# Patient Record
Sex: FEMALE | Race: ASIAN | Employment: UNEMPLOYED | URBAN - METROPOLITAN AREA
[De-identification: names, ages, dates, MRNs, and addresses within clinical notes are randomized per-mention and may not be internally consistent; named-entity substitution may affect disease eponyms.]

---

## 2017-01-30 DIAGNOSIS — Z48.298 AFTERCARE FOLLOWING ORGAN TRANSPLANT: ICD-10-CM

## 2017-01-30 DIAGNOSIS — Z94.0 KIDNEY REPLACED BY TRANSPLANT: ICD-10-CM

## 2017-01-30 DIAGNOSIS — Z79.899 ENCOUNTER FOR LONG-TERM CURRENT USE OF MEDICATION: ICD-10-CM

## 2017-01-30 PROCEDURE — 80197 ASSAY OF TACROLIMUS: CPT | Performed by: INTERNAL MEDICINE

## 2017-02-01 LAB
TACROLIMUS BLD-MCNC: 5.9 UG/L (ref 5–15)
TME LAST DOSE: NORMAL H

## 2017-02-28 ENCOUNTER — TRANSFERRED RECORDS (OUTPATIENT)
Dept: HEALTH INFORMATION MANAGEMENT | Facility: CLINIC | Age: 47
End: 2017-02-28

## 2017-03-10 ENCOUNTER — TELEPHONE (OUTPATIENT)
Dept: NEPHROLOGY | Facility: CLINIC | Age: 47
End: 2017-03-10

## 2017-03-10 NOTE — TELEPHONE ENCOUNTER
BP readings February to be scanned into chart. 2/28/17) 123/99 P 93 am 106/97 P 102 pm  Mayra Sheppard LPN  Nephrology  [facility]  [phone]

## 2017-03-30 DIAGNOSIS — Z79.899 ENCOUNTER FOR LONG-TERM CURRENT USE OF MEDICATION: ICD-10-CM

## 2017-03-30 DIAGNOSIS — Z94.0 KIDNEY REPLACED BY TRANSPLANT: ICD-10-CM

## 2017-03-30 DIAGNOSIS — Z48.298 AFTERCARE FOLLOWING ORGAN TRANSPLANT: ICD-10-CM

## 2017-03-30 PROCEDURE — 80197 ASSAY OF TACROLIMUS: CPT | Performed by: INTERNAL MEDICINE

## 2017-04-01 LAB
TACROLIMUS BLD-MCNC: 4.9 UG/L (ref 5–15)
TME LAST DOSE: ABNORMAL H

## 2017-04-30 ENCOUNTER — MEDICAL CORRESPONDENCE (OUTPATIENT)
Dept: HEALTH INFORMATION MANAGEMENT | Facility: CLINIC | Age: 47
End: 2017-04-30

## 2017-05-31 DIAGNOSIS — Z94.0 KIDNEY REPLACED BY TRANSPLANT: ICD-10-CM

## 2017-05-31 DIAGNOSIS — Z79.899 ENCOUNTER FOR LONG-TERM CURRENT USE OF MEDICATION: ICD-10-CM

## 2017-05-31 DIAGNOSIS — Z48.298 AFTERCARE FOLLOWING ORGAN TRANSPLANT: ICD-10-CM

## 2017-05-31 PROCEDURE — 80197 ASSAY OF TACROLIMUS: CPT | Performed by: INTERNAL MEDICINE

## 2017-06-02 LAB
TACROLIMUS BLD-MCNC: 4.9 UG/L (ref 5–15)
TME LAST DOSE: ABNORMAL H

## 2017-06-06 ENCOUNTER — TRANSFERRED RECORDS (OUTPATIENT)
Dept: HEALTH INFORMATION MANAGEMENT | Facility: CLINIC | Age: 47
End: 2017-06-06

## 2017-06-07 DIAGNOSIS — Z94.0 KIDNEY TRANSPLANTED: Primary | ICD-10-CM

## 2017-08-11 ENCOUNTER — TELEPHONE (OUTPATIENT)
Dept: TRANSPLANT | Facility: CLINIC | Age: 47
End: 2017-08-11

## 2017-08-11 NOTE — LETTER
[facility]  Room 2-200  [facility],  [address]  [address]  Tel [phone]  Toll Free [phone]                OUTPATIENT LABORATORY TEST ORDER    Patient Name: Zaira Gomez  Transplant Date: 8/9/2010 (Kidney), 11/15/1990 (Kidney)  YOB: 1970  Issue Date & Time:August 11, 201710:32 AM    [facility] MR:  [id number]  Exp. Date (1 year after date issued)      Diagnoses: Kidney Transplant (ICD-10  Z94.0)   Long term use of medications (ICD-10  Z79.899)     Lab results to be available on the same day drawn.   Patient should release information to the [facility], [facility].  Please fax to the Transplant Center at [phone].    Every 2 Months   ?Hemogram and Platelet   ?Basic Metabolic Panel (Sodium, Potassium, Chloride, CO2, Creatinine, BUN, Glucose, Calcium)   ?/Tacrolimus/Prograf drug level (mail to [facility] - mailers and instructions provided by the patient)           Every 6 Months Due: September and March                                                  ?Urine for protein/creatinine    If you have any questions, please call [facility] at [phone] or [phone].    Please fax labs to [phone]    Delfino Lira

## 2017-08-15 DIAGNOSIS — Z79.899 ENCOUNTER FOR LONG-TERM CURRENT USE OF MEDICATION: ICD-10-CM

## 2017-08-15 DIAGNOSIS — Z94.0 KIDNEY REPLACED BY TRANSPLANT: ICD-10-CM

## 2017-08-15 DIAGNOSIS — Z48.298 AFTERCARE FOLLOWING ORGAN TRANSPLANT: ICD-10-CM

## 2017-08-15 PROCEDURE — 80197 ASSAY OF TACROLIMUS: CPT | Performed by: INTERNAL MEDICINE

## 2017-08-17 LAB
TACROLIMUS BLD-MCNC: 5.4 UG/L (ref 5–15)
TME LAST DOSE: NORMAL H

## 2017-08-23 DIAGNOSIS — Z94.0 KIDNEY REPLACED BY TRANSPLANT: Primary | ICD-10-CM

## 2017-08-23 RX ORDER — PREDNISONE 5 MG/1
5 TABLET ORAL DAILY
Qty: 90 TABLET | Refills: 3 | Status: SHIPPED | OUTPATIENT
Start: 2017-08-23 | End: 2018-07-18

## 2017-09-21 DIAGNOSIS — Z79.899 ENCOUNTER FOR LONG-TERM CURRENT USE OF MEDICATION: ICD-10-CM

## 2017-09-21 DIAGNOSIS — Z48.298 AFTERCARE FOLLOWING ORGAN TRANSPLANT: ICD-10-CM

## 2017-09-21 DIAGNOSIS — Z94.0 KIDNEY REPLACED BY TRANSPLANT: ICD-10-CM

## 2017-09-21 RX ORDER — TACROLIMUS 0.5 MG/1
0.5 CAPSULE, GELATIN COATED ORAL 2 TIMES DAILY
Qty: 240 CAPSULE | Refills: 3 | Status: SHIPPED | OUTPATIENT
Start: 2017-09-21

## 2017-09-21 RX ORDER — MYCOPHENOLATE MOFETIL 250 MG/1
750 CAPSULE ORAL 2 TIMES DAILY
Qty: 240 CAPSULE | Refills: 11 | Status: SHIPPED | OUTPATIENT
Start: 2017-09-21

## 2017-09-21 RX ORDER — TACROLIMUS 1 MG/1
1 CAPSULE, GELATIN COATED ORAL 2 TIMES DAILY
Qty: 240 CAPSULE | Refills: 3 | Status: SHIPPED | OUTPATIENT
Start: 2017-09-21

## 2017-09-21 NOTE — TELEPHONE ENCOUNTER
Brenna ([facility], [place]) requesting refills on Prograf 0.5 mg, 1.0 mg, mycophenolate 250 mg Rx's. Pt due for med 10/1/17. They can be reached at [phone]. Phone: [phone]. Sent to Leesa Arias RN.

## 2017-10-18 PROCEDURE — 80197 ASSAY OF TACROLIMUS: CPT | Performed by: INTERNAL MEDICINE

## 2017-10-21 LAB
TACROLIMUS BLD-MCNC: 3.9 UG/L (ref 5–15)
TME LAST DOSE: ABNORMAL H

## 2017-11-02 ENCOUNTER — TELEPHONE (OUTPATIENT)
Dept: NEPHROLOGY | Facility: CLINIC | Age: 47
End: 2017-11-02

## 2017-11-02 NOTE — TELEPHONE ENCOUNTER
Left voicemail for patient to call back (follow up from last appointment).    Theresa Aparicio RN

## 2017-11-03 NOTE — TELEPHONE ENCOUNTER
Patient called back. States she's been doing very well since last appointment. Had a recent foot fracture, currently using a walker. Says she's gained some weight due to depression. Denied any other symptoms or concerns. Had labs drawn 10/18.        Theresa Aparicio RN

## 2017-11-06 NOTE — TELEPHONE ENCOUNTER
Patient called, wanted to know if she needed to do anything before her appointment. Had labs drawn 10/18, has labs ordered q3 months. She may not get here by 12:35 (appt. is at 1:05), but will get here as soon as she can. She will call me if she has any further issues.    Theresa Aparicio RN

## 2017-11-07 ENCOUNTER — OFFICE VISIT (OUTPATIENT)
Dept: NEPHROLOGY | Facility: CLINIC | Age: 47
End: 2017-11-07
Attending: INTERNAL MEDICINE
Payer: MEDICARE

## 2017-11-07 VITALS — OXYGEN SATURATION: 100 % | HEIGHT: 59 IN | HEART RATE: 77 BPM

## 2017-11-07 DIAGNOSIS — D84.9 IMMUNOSUPPRESSION (H): ICD-10-CM

## 2017-11-07 DIAGNOSIS — N25.81 SECONDARY RENAL HYPERPARATHYROIDISM (H): ICD-10-CM

## 2017-11-07 DIAGNOSIS — I10 BENIGN ESSENTIAL HYPERTENSION: ICD-10-CM

## 2017-11-07 DIAGNOSIS — E87.5 HYPERKALEMIA: ICD-10-CM

## 2017-11-07 DIAGNOSIS — N18.30 CKD (CHRONIC KIDNEY DISEASE) STAGE 3, GFR 30-59 ML/MIN (H): ICD-10-CM

## 2017-11-07 DIAGNOSIS — Z94.0 STATUS POST KIDNEY TRANSPLANT: Primary | ICD-10-CM

## 2017-11-07 PROCEDURE — 99212 OFFICE O/P EST SF 10 MIN: CPT | Mod: ZF

## 2017-11-07 RX ORDER — CHOLECALCIFEROL (VITAMIN D3) 50 MCG
TABLET ORAL
COMMUNITY
Start: 2011-11-21

## 2017-11-07 RX ORDER — FUROSEMIDE 20 MG
20 TABLET ORAL DAILY
Qty: 30 TABLET | Refills: 1 | Status: SHIPPED | OUTPATIENT
Start: 2017-11-07 | End: 2017-12-18

## 2017-11-07 RX ORDER — FOLIC ACID 1 MG/1
1 TABLET ORAL
COMMUNITY
Start: 2015-08-13

## 2017-11-07 NOTE — NURSING NOTE
"Chief Complaint   Patient presents with     RECHECK     Follow up kidney transplant.       Initial Pulse 77  Ht 1.499 m (4' 11\")  SpO2 100% Estimated body mass index is 29.61 kg/(m^2) as calculated from the following:    Height as of 9/20/16: 1.499 m (4' 11\").    Weight as of 9/20/16: 66.5 kg (146 lb 9.6 oz).  Medication Reconciliation: complete   Lissett Coulter., CMA    "

## 2017-11-07 NOTE — PATIENT INSTRUCTIONS
1. Please take lasix 20mg daily to help with lowering your potassium and with the lower extremity swelling  2. Please try to cut down on potassium in your diet.    Controlling Your Potassium Intake  For Patients with Kidney Disease  Potassium  Potassium is a mineral that helps your heart and muscles work properly. It is found in your body and in the foods you eat.  Healthy kidneys control the amount of potassium in your body. They remove any extra potassium that is not needed. If you have kidney disease, this extra potassium can build up in your blood, causing an irregular heartbeat (your heart does not beat the way it should).  You can limit the amount of potassium you eat to prevent stress to your heart and muscles.  Most doctors and dietitians will recommend that you try to eat less than 2000 to 3000 mg potassium a day. To stay in this range, you may eat one serving of high-potassium food. It may be better to choose low-potassium foods instead. (See the following charts.)  Salt substitutes  Salt substitutes are sold in the grocery store. In these products, the salt (sodium) is often replaced with potassium salt.  You should avoid all salt substitutes unless your doctor says they are safe for you to eat. Examples include Mena Salt Substitute, No Salt, Salt Substitute, Cardia and Lite Salt.  High-potassium foods (over 200 mg per serving)--limit to one serving per day  Unless listed differently below, one serving is the same as a 1/2 cup, or one medium piece.  Vegetables    Artichoke    Bamboo shoots    Beets, cooked    Bok rajiv    Broccoli, cooked    [place] sprouts (1 cup)    Carrots, raw    Mushrooms, canned    Potato, sweet potato or yam    Potato chips or french fries (10)    Squash or pumpkin    Spinach and other greens, cooked    Tomatoes and tomato products    Vegetable juice (1 cup or 8 ounces)  Fruits    Apricots, (2 medium fresh or 5 1/2 dried)    Avocado    Banana    Cantaloupe or honeydew (1 cup  cubed)    Dates and figs (5 whole)    Dried fruit    Grapefruit juice    Kiwi (1 large)    [place]    Nectarine, fresh    Orange, orange juice    Papaya ( 1/2 of a whole)    Pomegranate juice    Prunes or prune juice    Raisins, seedless  Low-potassium foods  Eating more than one serving can make a low-potassium food into a high-potassium food. Unless listed differently below, one serving is the same as a 1/2 cup, or one medium piece.  Fruits    Apricot, canned ( 1/2 cup, drained)    Apple (medium), applesauce, apple juice    Blueberries, fresh    Cherries, fresh    Cranberries, fresh    Fruit cocktail, canned (1 cup, drained)    Grapefruit    Grapes, grape juice    Mandarin orange    Peach (1 medium fresh or 1 cup halves, drained)    Pear (1 medium fresh or 1 cup halves, drained)    Plums    Pineapple (canned) or pineapple juice    Raspberries, fresh    Rhubarb    Strawberries, fresh    Tangerine    Watermelon (1 cup)  Vegetables    Asparagus (6 richard)    Beets (canned or pickled)    Broccoli, raw    Cabbage    Carrot, cooked    Cauliflower    Celery (1 stalk)    Corn, fresh    Cucumber    Eggplant    Green beans or waxed beans    Lettuce, iceberg    Mixed vegetables    Mushrooms, fresh    Onion    Peas    Peppers    Spinach, raw ( 1/2 cup)    Zucchini  For informational purposes only. Not to replace the advice of your health care provider.  Copyright   2004 [facility]. All rights reserved. [facility] [place] 210066 - REV 12/15.

## 2017-11-07 NOTE — PROGRESS NOTES
Assessment and Plan:  1. ESKD from [facility] s/p DDKT on 2010 - stable allograft function (labs from 10/18) with SrCr 1.7 (baseline 1.5-1.9). Potassium high at 5.7, patient unaware and not symptomatic. We will get repeat lab prior to her next visit. We will start a low dose furosemide 20mg daily to help with kaliuresis and leg edema. Her ProGraf level is low, therefore not likely to be contributing to her high potassium. She does not follow a low potassium diet . Will meet with Dr Leiva in December.   2. IS - ProGraf level last checked 10/18/2017 and was 3.9, goal 3-5. She is on Tacrolimus 1.5mg BID + CellCept 750mg BID + prednisone 5mg daily. She reports being compliant with her medications. Her ProGraf was last increased 1 year ago by Dr Roach at [facility] and Dr Leiva at [facility].   3. BP/Volume - BP at home is unknown, today it is 102/70. On carvedilol 6.25mg BID + lisinopril 2.5mg BID + diltiazem 90mg BID  4. Prophylaxis  - No prophylaxis   5. Anemia of chronic disease  - H/H 13.5/41.1, patient was previously on Aranesp but has not needed it for over a year.   6. CKDMB  - Ca 9.8, , On calcitriol 0.25 mg po twice weekly.   7. Atrial fibrillation - on carvedilol 6.25mg BID, she is rate controlled and had no palpitations or chest pain recently. She follows with Cardiology at [facility]. Takes 3mg daily.   8. HYPERkalemia: low potassium diet of ideally less than 3000 mg per day. If still greater than 5.5 meq / L, will need to stop lisinopril.     Assessment and plan was discussed with patient and she voiced her understanding and agreement.    Reason for Visit:  Ms. Gomez is here for routine follow up.    HPI:   Zaira Gomez is a 47 year old female with ESKD from [facility] and is status post DDKT on 2017. This was a  donor B cell positive cross-match kidney. Post-transplant course was complicated by antibody mediated rejection in 2010, treated with IVIG and PLEX. 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 Last documented MFI was >6000. Her baseline SrCr is between 1.5-1.9. She is currently on Tacrolimus 1.5mg BID + CellCept 750mg BID (goal ProGraf 6-8)         Transplant Hx:       Tx: DDKT  Date: 8/9/2010       Present Maintenance IS: Tacrolimus and Mycophenolate mofetil       Baseline Creatinine: 1.5-1.9       Recent DSA: Yes         Biopsy: Yes: suggestive of transplant glomerulopathy.    Home BP: not checked.      ROS:   A comprehensive review of systems was obtained and negative, except as noted in the HPI or PMH.    Active Medical Problems:  Patient Active Problem List   Diagnosis     Anticoagulated     Renal insufficiency     ATN (acute tubular necrosis) (H)     Sleep apnea     PTSD (post-traumatic stress disorder)     Immunosuppression (H)     S/P kidney transplant       Personal Hx:  Social History     Social History     Marital status: Single     Spouse name: N/A     Number of children: N/A     Years of education: N/A     Occupational History     Not on file.     Social History Main Topics     Smoking status: Never Smoker     Smokeless tobacco: Never Used     Alcohol use No     Drug use: No     Sexual activity: Not on file     Other Topics Concern     Not on file     Social History Narrative       Allergies:  Allergies   Allergen Reactions     Acetaminophen      No reaction     Aspirin [Dihydroxyaluminum Aminoacetate]      No reaction     Benztropine Mesylate      No reaction     Codeine Sulfate GI Disturbance     Nsaids      No reaction     Medications:  Prior to Admission medications    Medication Sig Start Date End Date Taking? Authorizing Provider   PROGRAF (BRAND) 0.5 MG CAPSULE Take 1 capsule (0.5 mg) by mouth 2 times daily 9/21/17   Delfino Lira MD   PROGRAF (BRAND) 1 MG CAPSULE Take 1 capsule (1 mg) by mouth 2 times daily 9/21/17   Pankaj  Delfino Coleman MD   mycophenolate (GENERIC EQUIVALENT) 250 MG capsule Take 3 capsules (750 mg) by mouth 2 times daily 9/21/17   Delfino Lira MD   predniSONE (DELTASONE) 5 MG tablet Take 1 tablet (5 mg) by mouth daily 8/23/17   Delfino Lira MD   LISINOPRIL PO Take 2.5 mg by mouth 2 times daily     Reported, Patient   CALCITRIOL PO Take 0.25 mcg by mouth twice a week Mon. thurs    Reported, Patient   LORAZEPAM PO Take 2 mg by mouth At Bedtime    Reported, Patient   esomeprazole (NEXIUM) 40 MG capsule Take 1 capsule by mouth daily    Reported, Patient   Multiple Vitamins-Minerals (MULTIVITAL) TABS Take 1 tablet by mouth daily    Reported, Patient   gabapentin (NEURONTIN) 100 MG capsule Take 100 mg by mouth 3 times daily    Reported, Patient   sodium bicarbonate 650 MG tablet Take 1 tablet by mouth 2 times daily. 7/23/13   Jennifer Flynn MD   diltiazem (CARDIZEM) 60 MG tablet Take 60 mg by mouth 2 times daily HOLD if BP is under 100    Reported, Patient   amitriptyline (ELAVIL) 10 MG tablet Take 1 tablet by mouth At Bedtime     Reported, Patient   tenofovir (VIREAD) 300 MG tablet Take 1 tablet by mouth daily.  Patient taking differently: Take 300 mg by mouth every 72 hours  9/4/12   Jennifer Flynn MD   carvedilol (COREG) 6.25 MG tablet Take 1 tablet by mouth 2 times daily (with meals).  Patient taking differently: Take 6.25 mg by mouth 2 times daily (with meals) HOld if BP is under 100 8/23/12   Jennifer Llamas PA-C   LORAZEPAM PO Take 0.5 mg by mouth 3 times daily. Take at 0800, 1200, and 1600     Unknown, Entered By History   HYDROMORPHONE HCL PO Take 4 mg by mouth 2 times daily Take at 1300 and 2100    Unknown, Entered By History   TRAMADOL HCL PO Take 50 mg by mouth every 4 hours as needed     Unknown, Entered By History   GABAPENTIN PO Take 400 mg by mouth 3 times daily.    Unknown, Entered By History   WARFARIN SODIUM PO Take 2 mg by mouth daily.    Unknown, Entered By History   BusPIRone HCl  (BUSPAR PO) Take 30 mg by mouth 3 times daily     Unknown, Entered By History   cycloSPORINE (RESTASIS) 0.05 % ophthalmic emulsion Place 1 drop into both eyes 2 times daily.    Unknown, Entered By History   vilazodone (VIIBRYD) 40 MG TABS Take 40 mg by mouth At Bedtime.    Unknown, Entered By History   ARIPiprazole (ABILIFY PO) Take 5 mg by mouth 3 times daily     Unknown, Entered By History   ramelteon (ROZEREM) 8 MG tablet Take 8 mg by mouth At Bedtime.    Reported, Patient       Vitals:      Exam:   GENERAL APPEARANCE: alert and no distress, care coordinator in the room. Patient appears mildly anxious. In wheelchair, recently had a Garcia fracture.  HENT: mouth without ulcers or lesions, moist mucous membranes  LYMPHATICS: no cervical or supraclavicular nodes  RESP: lungs clear to auscultation - no rales, rhonchi or wheezes  CV: irregular rhythm, normal rate, no rub, no murmur  EDEMA: LE edema in left leg, right leg in cast but patient says she has room to move inside cast and doesn't feel swollen.  ABDOMEN: soft, nondistended, nontender, bowel sounds normal  MS: extremities normal - no gross deformities noted, no evidence of inflammation in joints, no muscle tenderness  SKIN: no rash    Results:   Electrolytes/Renal -   Recent Labs   Lab Test  09/30/14   1233  08/23/12   0644  08/22/12   0643  08/21/12   0554  08/20/12   0806   03/15/12   0558  03/14/12   0800   NA  135  139  140  141  140   < >  136  134   POTASSIUM  5.5*  4.0  3.8  3.7  3.7   < >  4.3  4.4   CHLORIDE  108  111*  110*  110*  110*   < >  110*  108   CO2  21  18*  18*  19*  18*   < >  22  21   BUN  34*  12  13  14  16   < >  16  14   CR  1.45*  1.73*  1.81*  1.97*  2.01*   < >  1.41*  1.34*   GLC  149*  94  98  91  94   < >  100*  98   JOSE F  9.8  9.3  9.2  9.1  9.5   < >  9.7  9.7   MAG   --    --    --    --   2.1   --   2.0  2.0   PHOS   --    --    --   2.1*  2.2*   --   3.2  3.2    < > = values in this interval not displayed.       CBC -    Recent Labs   Lab Test  09/30/14   1233  08/23/12   0644  08/22/12   0643   WBC  6.1  3.0*  4.0   HGB  11.1*  7.1*  7.8*   PLT  92*  82*  99*       LFTs -   Recent Labs   Lab Test  03/11/12   1242  11/15/10   1108  10/28/10   0608  10/12/10   2304   ALKPHOS  85  234*   --   327*   BILITOTAL  0.6  <0.1*   --   0.7   ALT  <6  20   --   15   AST  22  20   --   21   PROTTOTAL  6.4*  7.3   --   7.0   ALBUMIN  3.8*  4.0  3.9  3.9       Coags -   Recent Labs   Lab Test  08/23/12   0644  08/22/12   0643  08/21/12   0554   03/11/12   1242   06/07/11   1136   01/24/11   1404   INR  1.37*  1.22*  1.61*   < >  1.71*   < >  3.19*   < >  1.62*   PTT   --    --    --    --   32   --   49*   --   37    < > = values in this interval not displayed.       Iron Panel -   Recent Labs   Lab Test  08/11/10   0734  10/21/09   1125   IRON  77  68   IRONSAT  46  28   ASTER   --   1153*       Endocrine -   Recent Labs   Lab Test  08/22/12   0643  08/02/11   1357  06/08/11   0634  10/14/10   0653   A1C  5.6  5.6  6.6*   --    TSH   --   0.88   --   1.90     Attestation:  This patient has been seen and evaluated by me, Tyrese Go MD.  I have reviewed the note and agree with plan of care as documented by the fellow.

## 2017-11-07 NOTE — LETTER
2017      RE: Zaira Gomez  [id number]  [facility] [address]       Assessment and Plan:  1. ESKD from [facility] s/p DDKT on 2010 - stable allograft function (labs from 10/18) with SrCr 1.7 (baseline 1.5-1.9). Potassium high at 5.7, patient unaware and not symptomatic. We will get repeat lab prior to her next visit. We will start a low dose furosemide 20mg daily to help with kaliuresis and leg edema. Her ProGraf level is low, therefore not likely to be contributing to her high potassium. She does not follow a low potassium diet . Will meet with Dr Leiva in December.   2. IS - ProGraf level last checked 10/18/2017 and was 3.9, goal 3-5. She is on Tacrolimus 1.5mg BID + CellCept 750mg BID + prednisone 5mg daily. She reports being compliant with her medications. Her ProGraf was last increased 1 year ago by Dr Roach at [facility] and Dr Leiva at [facility].   3. BP/Volume - BP at home is unknown, today it is 102/70. On carvedilol 6.25mg BID + lisinopril 2.5mg BID + diltiazem 90mg BID  4. Prophylaxis  - No prophylaxis   5. Anemia of chronic disease  - H/H 13.5/41.1, patient was previously on Aranesp but has not needed it for over a year.   6. CKDMB  - Ca 9.8, , On calcitriol 0.25 mg po twice weekly.   7. Atrial fibrillation - on carvedilol 6.25mg BID, she is rate controlled and had no palpitations or chest pain recently. She follows with Cardiology at [facility]. Takes 3mg daily.   8. HYPERkalemia: low potassium diet of ideally less than 3000 mg per day. If still greater than 5.5 meq / L, will need to stop lisinopril.     Assessment and plan was discussed with patient and she voiced her understanding and agreement.    Reason for Visit:  Ms. Gomez is here for routine follow up.    HPI:   Zaira Gomez is a 47 year old female with ESKD from [facility] and is status post DDKT on 2017. This was a  donor B cell positive cross-match kidney. 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 Last documented MFI was >6000. Her baseline SrCr is between 1.5-1.9. She is currently on Tacrolimus 1.5mg BID + CellCept 750mg BID (goal ProGraf 6-8)         Transplant Hx:       Tx: DDKT  Date: 8/9/2010       Present Maintenance IS: Tacrolimus and Mycophenolate mofetil       Baseline Creatinine: 1.5-1.9       Recent DSA: Yes         Biopsy: Yes: suggestive of transplant glomerulopathy.    Home BP: not checked.      ROS:   A comprehensive review of systems was obtained and negative, except as noted in the HPI or PMH.    Active Medical Problems:  Patient Active Problem List   Diagnosis     Anticoagulated     Renal insufficiency     ATN (acute tubular necrosis) (H)     Sleep apnea     PTSD (post-traumatic stress disorder)     Immunosuppression (H)     S/P kidney transplant       Personal Hx:  Social History     Social History     Marital status: Single     Spouse name: N/A     Number of children: N/A     Years of education: N/A     Occupational History     Not on file.     Social History Main Topics     Smoking status: Never Smoker     Smokeless tobacco: Never Used     Alcohol use No     Drug use: No     Sexual activity: Not on file     Other Topics Concern     Not on file     Social History Narrative       Allergies:  Allergies   Allergen Reactions     Acetaminophen      No reaction     Aspirin [Dihydroxyaluminum Aminoacetate]      No reaction     Benztropine Mesylate      No reaction     Codeine Sulfate GI Disturbance     Nsaids      No reaction     Medications:  Prior to Admission medications    Medication Sig Start Date End Date Taking? Authorizing Provider   PROGRAF (BRAND) 0.5 MG CAPSULE Take 1 capsule (0.5 mg) by mouth 2 times daily 9/21/17   Delfino Lira MD    PROGRAF (BRAND) 1 MG CAPSULE Take 1 capsule (1 mg) by mouth 2 times daily 9/21/17   Delfino Lira MD   mycophenolate (GENERIC EQUIVALENT) 250 MG capsule Take 3 capsules (750 mg) by mouth 2 times daily 9/21/17   Delfino Lira MD   predniSONE (DELTASONE) 5 MG tablet Take 1 tablet (5 mg) by mouth daily 8/23/17   Delfino Lira MD   LISINOPRIL PO Take 2.5 mg by mouth 2 times daily     Reported, Patient   CALCITRIOL PO Take 0.25 mcg by mouth twice a week Mon. thurs    Reported, Patient   LORAZEPAM PO Take 2 mg by mouth At Bedtime    Reported, Patient   esomeprazole (NEXIUM) 40 MG capsule Take 1 capsule by mouth daily    Reported, Patient   Multiple Vitamins-Minerals (MULTIVITAL) TABS Take 1 tablet by mouth daily    Reported, Patient   gabapentin (NEURONTIN) 100 MG capsule Take 100 mg by mouth 3 times daily    Reported, Patient   sodium bicarbonate 650 MG tablet Take 1 tablet by mouth 2 times daily. 7/23/13   Jennifer Flynn MD   diltiazem (CARDIZEM) 60 MG tablet Take 60 mg by mouth 2 times daily HOLD if BP is under 100    Reported, Patient   amitriptyline (ELAVIL) 10 MG tablet Take 1 tablet by mouth At Bedtime     Reported, Patient   tenofovir (VIREAD) 300 MG tablet Take 1 tablet by mouth daily.  Patient taking differently: Take 300 mg by mouth every 72 hours  9/4/12   Jennifer Flynn MD   carvedilol (COREG) 6.25 MG tablet Take 1 tablet by mouth 2 times daily (with meals).  Patient taking differently: Take 6.25 mg by mouth 2 times daily (with meals) HOld if BP is under 100 8/23/12   Jennifer Llamas PA-C   LORAZEPAM PO Take 0.5 mg by mouth 3 times daily. Take at 0800, 1200, and 1600     Unknown, Entered By History   HYDROMORPHONE HCL PO Take 4 mg by mouth 2 times daily Take at 1300 and 2100    Unknown, Entered By History   TRAMADOL HCL PO Take 50 mg by mouth every 4 hours as needed     Unknown, Entered By History   GABAPENTIN PO Take 400 mg by mouth 3 times daily.    Unknown, Entered By History    WARFARIN SODIUM PO Take 2 mg by mouth daily.    Unknown, Entered By History   BusPIRone HCl (BUSPAR PO) Take 30 mg by mouth 3 times daily     Unknown, Entered By History   cycloSPORINE (RESTASIS) 0.05 % ophthalmic emulsion Place 1 drop into both eyes 2 times daily.    Unknown, Entered By History   vilazodone (VIIBRYD) 40 MG TABS Take 40 mg by mouth At Bedtime.    Unknown, Entered By History   ARIPiprazole (ABILIFY PO) Take 5 mg by mouth 3 times daily     Unknown, Entered By History   ramelteon (ROZEREM) 8 MG tablet Take 8 mg by mouth At Bedtime.    Reported, Patient       Vitals:      Exam:   GENERAL APPEARANCE: alert and no distress, care coordinator in the room. Patient appears mildly anxious. In wheelchair, recently had a Garcia fracture.  HENT: mouth without ulcers or lesions, moist mucous membranes  LYMPHATICS: no cervical or supraclavicular nodes  RESP: lungs clear to auscultation - no rales, rhonchi or wheezes  CV: irregular rhythm, normal rate, no rub, no murmur  EDEMA: LE edema in left leg, right leg in cast but patient says she has room to move inside cast and doesn't feel swollen.  ABDOMEN: soft, nondistended, nontender, bowel sounds normal  MS: extremities normal - no gross deformities noted, no evidence of inflammation in joints, no muscle tenderness  SKIN: no rash    Results:   Electrolytes/Renal -   Recent Labs   Lab Test  09/30/14   1233  08/23/12   0644  08/22/12   0643  08/21/12   0554  08/20/12   0806   03/15/12   0558  03/14/12   0800   NA  135  139  140  141  140   < >  136  134   POTASSIUM  5.5*  4.0  3.8  3.7  3.7   < >  4.3  4.4   CHLORIDE  108  111*  110*  110*  110*   < >  110*  108   CO2  21  18*  18*  19*  18*   < >  22  21   BUN  34*  12  13  14  16   < >  16  14   CR  1.45*  1.73*  1.81*  1.97*  2.01*   < >  1.41*  1.34*   GLC  149*  94  98  91  94   < >  100*  98   JOSE F  9.8  9.3  9.2  9.1  9.5   < >  9.7  9.7   MAG   --    --    --    --   2.1   --   2.0  2.0   PHOS   --    --    --    2.1*  2.2*   --   3.2  3.2    < > = values in this interval not displayed.       CBC -   Recent Labs   Lab Test  09/30/14   1233  08/23/12   0644  08/22/12   0643   WBC  6.1  3.0*  4.0   HGB  11.1*  7.1*  7.8*   PLT  92*  82*  99*       LFTs -   Recent Labs   Lab Test  03/11/12   1242  11/15/10   1108  10/28/10   0608  10/12/10   2304   ALKPHOS  85  234*   --   327*   BILITOTAL  0.6  <0.1*   --   0.7   ALT  <6  20   --   15   AST  22  20   --   21   PROTTOTAL  6.4*  7.3   --   7.0   ALBUMIN  3.8*  4.0  3.9  3.9       Coags -   Recent Labs   Lab Test  08/23/12   0644  08/22/12   0643  08/21/12   0554   03/11/12   1242   06/07/11   1136   01/24/11   1404   INR  1.37*  1.22*  1.61*   < >  1.71*   < >  3.19*   < >  1.62*   PTT   --    --    --    --   32   --   49*   --   37    < > = values in this interval not displayed.       Iron Panel -   Recent Labs   Lab Test  08/11/10   0734  10/21/09   1125   IRON  77  68   IRONSAT  46  28   ASTER   --   1153*       Endocrine -   Recent Labs   Lab Test  08/22/12   0643  08/02/11   1357  06/08/11   0634  10/14/10   0653   A1C  5.6  5.6  6.6*   --    TSH   --   0.88   --   1.90     Attestation:  This patient has been seen and evaluated by me, Tyrese Go MD.  I have reviewed the note and agree with plan of care as documented by the fellow.       Tyrese Go MD

## 2017-11-07 NOTE — MR AVS SNAPSHOT
After Visit Summary   11/7/2017    Zaira Gomez    MRN: [id number]           Patient Information     Date Of Birth          1970        Visit Information        Provider Department      11/7/2017 1:05 PM Tyrese Go MD [facility] Nephrology        Today's Diagnoses     Status post kidney transplant    -  1    Hyperkalemia        Immunosuppression (H)        CKD (chronic kidney disease) stage 3, GFR 30-59 ml/min        Benign essential hypertension        Secondary renal hyperparathyroidism (H)          Care Instructions    1. Please take lasix 20mg daily to help with lowering your potassium and with the lower extremity swelling  2. Please try to cut down on potassium in your diet.    Controlling Your Potassium Intake  For Patients with Kidney Disease  Potassium  Potassium is a mineral that helps your heart and muscles work properly. It is found in your body and in the foods you eat.  Healthy kidneys control the amount of potassium in your body. They remove any extra potassium that is not needed. If you have kidney disease, this extra potassium can build up in your blood, causing an irregular heartbeat (your heart does not beat the way it should).  You can limit the amount of potassium you eat to prevent stress to your heart and muscles.  Most doctors and dietitians will recommend that you try to eat less than 2000 to 3000 mg potassium a day. To stay in this range, you may eat one serving of high-potassium food. It may be better to choose low-potassium foods instead. (See the following charts.)  Salt substitutes  Salt substitutes are sold in the grocery store. In these products, the salt (sodium) is often replaced with potassium salt.  You should avoid all salt substitutes unless your doctor says they are safe for you to eat. Examples include Mena Salt Substitute, No Salt, Salt Substitute, Cardia and Lite Salt.  High-potassium foods (over 200 mg per serving)--limit to one serving per  day  Unless listed differently below, one serving is the same as a 1/2 cup, or one medium piece.  Vegetables    Artichoke    Bamboo shoots    Beets, cooked    Bok rajiv    Broccoli, cooked    [place] sprouts (1 cup)    Carrots, raw    Mushrooms, canned    Potato, sweet potato or yam    Potato chips or french fries (10)    Squash or pumpkin    Spinach and other greens, cooked    Tomatoes and tomato products    Vegetable juice (1 cup or 8 ounces)  Fruits    Apricots, (2 medium fresh or 5 1/2 dried)    Avocado    Banana    Cantaloupe or honeydew (1 cup cubed)    Dates and figs (5 whole)    Dried fruit    Grapefruit juice    Kiwi (1 large)    Harshal    Nectarine, fresh    Orange, orange juice    Papaya ( 1/2 of a whole)    Pomegranate juice    Prunes or prune juice    Raisins, seedless  Low-potassium foods  Eating more than one serving can make a low-potassium food into a high-potassium food. Unless listed differently below, one serving is the same as a 1/2 cup, or one medium piece.  Fruits    Apricot, canned ( 1/2 cup, drained)    Apple (medium), applesauce, apple juice    Blueberries, fresh    Cherries, fresh    Cranberries, fresh    Fruit cocktail, canned (1 cup, drained)    Grapefruit    Grapes, grape juice    Mandarin orange    Peach (1 medium fresh or 1 cup halves, drained)    Pear (1 medium fresh or 1 cup halves, drained)    Plums    Pineapple (canned) or pineapple juice    Raspberries, fresh    Rhubarb    Strawberries, fresh    Tangerine    Watermelon (1 cup)  Vegetables    Asparagus (6 richard)    Beets (canned or pickled)    Broccoli, raw    Cabbage    Carrot, cooked    Cauliflower    Celery (1 stalk)    Corn, fresh    Cucumber    Eggplant    Green beans or waxed beans    Lettuce, iceberg    Mixed vegetables    Mushrooms, fresh    Onion    Peas    Peppers    Spinach, raw ( 1/2 cup)    Zucchini  For informational purposes only. Not to replace the advice of your health care provider.  Copyright   2004 [place]  [facility]. All rights reserved. [employer] 070124 - REV 12/15.            Follow-ups after your visit        Follow-up notes from your care team     Return in about 1 year (around 2018).      Who to contact     If you have questions or need follow up information about today's clinic visit or your schedule please contact [facility]PHROLOG[facility] directly at [phone].  Normal or non-critical lab and imaging results will be communicated to you by [employer], letter or phone within 4 business days after the clinic has received the results. If you do not hear from us within 7 days, please contact the clinic through [employer] or phone. If you have a critical or abnormal lab result, we will notify you by phone as soon as possible.  Submit refill requests through [facility] or call your pharmacy and they will forward the refill request to us. Please allow 3 business days for your refill to be completed.          Additional Information About Your Visit        [employer] Information     [facility] lets you send messages to your doctor, view your test results, renew your prescriptions, schedule appointments and more. To sign up, go to [email or web address] . Click on \"Log in\" on the left side of the screen, which will take you to the Welcome page. Then click on \"Sign up Now\" on the right side of the page.     You will be asked to enter the access code listed below, as well as some personal information. Please follow the directions to create your username and password.     Your access code is: [email or web address]  Expires: 2018  5:30 AM     Your access code will  in 90 days. If you need help or a new code, please call your [place] clinic or [phone].        Care EveryWhere ID     This is your Care EveryWhere ID. This could be used by other organizations to access your [place] medical records  [id number]        Your Vitals Were     Pulse Height Pulse Oximetry             77 1.499 m (4' 11\") 100%          Blood Pressure " from Last 3 Encounters:   09/20/16 112/79   09/22/15 96/73   09/30/14 110/80    Weight from Last 3 Encounters:   09/20/16 66.5 kg (146 lb 9.6 oz)   09/22/15 64.5 kg (142 lb 4.8 oz)   09/30/14 67.7 kg (149 lb 3.2 oz)                 Today's Medication Changes          These changes are accurate as of: 11/7/17 11:59 PM.  If you have any questions, ask your nurse or doctor.               Start taking these medicines.        Dose/Directions    furosemide 20 MG tablet   Commonly known as:  LASIX   Used for:  Hyperkalemia   Started by:  Tyrese Go MD        Dose:  20 mg   Take 1 tablet (20 mg) by mouth daily   Quantity:  30 tablet   Refills:  1         These medicines have changed or have updated prescriptions.        Dose/Directions    carvedilol 6.25 MG tablet   Commonly known as:  COREG   This may have changed:  additional instructions   Used for:  Chronic atrial fibrillation (H)        Dose:  6.25 mg   Take 1 tablet by mouth 2 times daily (with meals).   Quantity:  180 tablet   Refills:  3       tenofovir 300 MG tablet   Commonly known as:  VIREAD   This may have changed:  when to take this   Used for:  Chronic hepatitis B (H)        Dose:  300 mg   Take 1 tablet by mouth daily.   Quantity:  30 tablet   Refills:  6            Where to get your medicines      These medications were sent to [address] [address]    Hours:  TRANSPLANT PHONE NUMBER [phone] Phone:  [phone]     furosemide 20 MG tablet                Primary Care Provider Office Phone # Fax #    Rosalee ROSALES Sarah [phone]       [facility] [address] [place] [id number]        Equal Access to Services     LUCRECIA COLLINS AH: Agapito Aguilar, liliya rodriguez, lisa kaalemerald marc, noah agustin. So [facility] [phone].    ATENCIÓN: michelle Weston  klein disposición servicios gratuitos de asistencia lingüística. Eamon galvan [phone].    We comply with applicable federal civil rights laws and [place] laws. We do not discriminate on the basis of race, color, national origin, age, disability, sex, sexual orientation, or gender identity.            Thank you!     Thank you for choosing [facility] NEPHROLOGY  for your care. Our goal is always to provide you with excellent care. Hearing back from our patients is one way we can continue to improve our services. Please take a few minutes to complete the written survey that you may receive in the mail after your visit with us. Thank you!             Your Updated Medication List - Protect others around you: Learn how to safely use, store and throw away your medicines at www.disposemymeds.org.          This list is accurate as of: 11/7/17 11:59 PM.  Always use your most recent med list.                   Brand Name Dispense Instructions for use Diagnosis    ABILIFY PO      Take 5 mg by mouth 3 times daily        amitriptyline 10 MG tablet    ELAVIL     Take 1 tablet by mouth At Bedtime        BUSPAR PO      Take 30 mg by mouth 3 times daily        CALCITRIOL PO      Take 0.25 mcg by mouth twice a week Mon. thurs        carboxymethylcellulose 1 % ophthalmic solution    CELLUVISC/REFRESH LIQUIGEL     Apply 1 drop to eye        carvedilol 6.25 MG tablet    COREG    180 tablet    Take 1 tablet by mouth 2 times daily (with meals).    Chronic atrial fibrillation (H)       cycloSPORINE 0.05 % ophthalmic emulsion    RESTASIS     Place 1 drop into both eyes 2 times daily.        diltiazem 60 MG tablet    CARDIZEM     Take 60 mg by mouth 2 times daily HOLD if BP is under 100        folic acid 1 MG tablet    FOLVITE     Take 1 mg by mouth        furosemide 20 MG tablet    LASIX    30 tablet    Take 1 tablet (20 mg) by mouth daily    Hyperkalemia       GABAPENTIN (ONCE-DAILY) PO      Take 400 mg by mouth        * GABAPENTIN PO       Take 400 mg by mouth 3 times daily.        * gabapentin 100 MG capsule    NEURONTIN     Take 100 mg by mouth 3 times daily        HYDROMORPHONE HCL PO      Take 4 mg by mouth 2 times daily Take at 1300 and 2100        HYDROXYZINE HCL PO      Take 25 mg by mouth 25 mg once a day at 6 pm and  25 mg prn at noon.        LISINOPRIL PO      Take 2.5 mg by mouth 2 times daily        * LORAZEPAM PO      Take 0.5 mg by mouth daily Take at 0800, 1200, and 1600        * LORAZEPAM PO      Take 2 mg by mouth At Bedtime        MULTIVITAL Tabs      Take 1 tablet by mouth daily        mycophenolate 250 MG capsule    GENERIC EQUIVALENT    240 capsule    Take 3 capsules (750 mg) by mouth 2 times daily    Kidney replaced by transplant       nexIUM 40 MG CR capsule   Generic drug:  esomeprazole      Take 1 capsule by mouth daily        predniSONE 5 MG tablet    DELTASONE    90 tablet    Take 1 tablet (5 mg) by mouth daily    Kidney replaced by transplant       ramelteon 8 MG tablet    ROZEREM     Take 8 mg by mouth At Bedtime.        sodium bicarbonate 650 MG tablet     60 tablet    Take 1 tablet by mouth 2 times daily.    Acidosis       * tacrolimus 0.5 MG capsule     240 capsule    Take 1 capsule (0.5 mg) by mouth 2 times daily    Aftercare following organ transplant, Kidney replaced by transplant, Encounter for long-term current use of medication       * tacrolimus 1 MG capsule     240 capsule    Take 1 capsule (1 mg) by mouth 2 times daily    Encounter for long-term current use of medication, Kidney replaced by transplant, Aftercare following organ transplant       tenofovir 300 MG tablet    VIREAD    30 tablet    Take 1 tablet by mouth daily.    Chronic hepatitis B (H)       TRAMADOL HCL PO      Take 50 mg by mouth every 4 hours as needed        vilazodone 40 MG Tabs tablet    VIIBRYD     Take 40 mg by mouth At Bedtime.        vitamin D 2000 UNITS tablet           WARFARIN SODIUM PO      Take 2 mg by mouth daily.        * Notice:   This list has 6 medication(s) that are the same as other medications prescribed for you. Read the directions carefully, and ask your doctor or other care provider to review them with you.

## 2017-12-18 DIAGNOSIS — E87.5 HYPERKALEMIA: ICD-10-CM

## 2017-12-18 RX ORDER — FUROSEMIDE 20 MG
20 TABLET ORAL DAILY
Qty: 30 TABLET | Refills: 11 | Status: SHIPPED | OUTPATIENT
Start: 2017-12-18

## 2017-12-18 NOTE — TELEPHONE ENCOUNTER
Last Office Visit with Nephrologist:  11/7/17.  Medication refilled per Nephrology Clinic protocol.     Theresa Aparicio RN

## 2018-01-04 ENCOUNTER — TRANSFERRED RECORDS (OUTPATIENT)
Dept: HEALTH INFORMATION MANAGEMENT | Facility: CLINIC | Age: 48
End: 2018-01-04

## 2018-01-24 ENCOUNTER — HOSPITAL ENCOUNTER (OUTPATIENT)
Facility: CLINIC | Age: 48
Setting detail: SPECIMEN
Discharge: HOME | End: 2018-01-24
Admitting: INTERNAL MEDICINE
Payer: MEDICARE

## 2018-01-24 PROCEDURE — 80197 ASSAY OF TACROLIMUS: CPT | Performed by: INTERNAL MEDICINE

## 2018-01-26 DIAGNOSIS — Z48.298 AFTERCARE FOLLOWING ORGAN TRANSPLANT: Primary | ICD-10-CM

## 2018-01-26 DIAGNOSIS — Z79.899 ENCOUNTER FOR LONG-TERM CURRENT USE OF MEDICATION: ICD-10-CM

## 2018-01-26 DIAGNOSIS — Z94.0 KIDNEY REPLACED BY TRANSPLANT: ICD-10-CM

## 2018-01-26 LAB
TACROLIMUS BLD-MCNC: 4.7 UG/L (ref 5–15)
TME LAST DOSE: ABNORMAL H

## 2018-02-08 ENCOUNTER — MEDICAL CORRESPONDENCE (OUTPATIENT)
Dept: HEALTH INFORMATION MANAGEMENT | Facility: CLINIC | Age: 48
End: 2018-02-08

## 2018-07-18 DIAGNOSIS — Z94.0 KIDNEY REPLACED BY TRANSPLANT: ICD-10-CM

## 2018-07-18 RX ORDER — PREDNISONE 5 MG/1
5 TABLET ORAL DAILY
Qty: 90 TABLET | Refills: 3 | Status: SHIPPED | OUTPATIENT
Start: 2018-07-18 | End: 2019-06-21

## 2018-07-18 NOTE — TELEPHONE ENCOUNTER
Call placed to patient. No answer. Voice message left instructing patient to complete transplant labs for future refills.

## 2018-07-19 ENCOUNTER — TELEPHONE (OUTPATIENT)
Dept: TRANSPLANT | Facility: CLINIC | Age: 48
End: 2018-07-19

## 2018-07-19 NOTE — TELEPHONE ENCOUNTER
Call returned to patient. Writer informed patient that a  will contact her to help transition through this process.

## 2018-07-19 NOTE — TELEPHONE ENCOUNTER
"Patient Call: Insurance  Route to LPN  Reason for Call: Insurance issue: [place] Medicaid     Received call from patient stating she received letter in the mail telling her our facility will no longer be covering patients using Medicare. Told patient we were having a change in coverage for patients with [place] \"Medicaid\" as their primary, but that I knew nothing about an issue with Medicare. Patient states she has Medicare as her primary and [place] as her secondary and is concerned about her status here as a patient. She has began talking with her nephrologist about transferring her care and would like a call back from coordinator to verify if this is necessary.   "

## 2019-06-21 DIAGNOSIS — Z94.0 KIDNEY REPLACED BY TRANSPLANT: ICD-10-CM

## 2019-06-21 RX ORDER — PREDNISONE 5 MG/1
5 TABLET ORAL DAILY
Qty: 90 TABLET | Refills: 3 | Status: SHIPPED | OUTPATIENT
Start: 2019-06-21 | End: 2020-05-19

## 2019-12-10 ENCOUNTER — TRANSFERRED RECORDS (OUTPATIENT)
Dept: HEALTH INFORMATION MANAGEMENT | Facility: CLINIC | Age: 49
End: 2019-12-10

## 2019-12-13 ENCOUNTER — MEDICAL CORRESPONDENCE (OUTPATIENT)
Dept: HEALTH INFORMATION MANAGEMENT | Facility: CLINIC | Age: 49
End: 2019-12-13

## 2020-02-10 ENCOUNTER — TRANSFERRED RECORDS (OUTPATIENT)
Dept: HEALTH INFORMATION MANAGEMENT | Facility: CLINIC | Age: 50
End: 2020-02-10

## 2020-05-19 DIAGNOSIS — Z94.0 KIDNEY REPLACED BY TRANSPLANT: Primary | ICD-10-CM

## 2020-05-19 RX ORDER — PREDNISONE 5 MG/1
5 TABLET ORAL DAILY
Qty: 90 TABLET | Refills: 0 | Status: SHIPPED | OUTPATIENT
Start: 2020-05-19 | End: 2020-07-21

## 2020-05-19 NOTE — LETTER
OUTPATIENT LABORATORY TEST ORDER    Patient Name: Zaira Gomez  Transplant Date: 8/9/2010   YOB: 1970                                      Issue Date & Time:5/19/2020  9:18 AM    [facility] MR:  [id number]  Exp. Date (1 year after date issued)      Diagnoses: Kidney Transplant (ICD-10  Z94.0)   Long term use of medications (ICD-10  Z79.899)     Lab results to be available on the same day drawn.   Patient should release information to the [facility].  Please fax to the Transplant Center at [phone].    Every 3 Months    ?Hemogram and Platelet   ?Basic Metabolic Panel (Sodium, Potassium, Chloride, CO2, Creatinine, BUN, Glucose, Calcium)   ?/Tacrolimus/Prograf drug level                   Every 6 Months                                                        ?Urine for protein/creatinine    If you have any questions, please call The Transplant Center at [phone] or [phone].    Please fax labs to [phone]

## 2020-05-19 NOTE — TELEPHONE ENCOUNTER
Who is prescribing Prograf and Cellcept? Is it Dr. Leiva at [facility]?  Last refills from the [facility] in 2017.    LPN task:  Call and invite back for Annual appointment with Transplant Nephrology (Tele Visit).  Send a message to Scheduling pool to set-up visit.  Please send a letter if unable to discuss on the phone.    Rx sent for Prednisone (3 months + No refills)

## 2020-05-19 NOTE — LETTER
Zaira Gomez  [address] [place] [address] [id number]  [facility] [address]                May 19, 2020    Dear Zaira,    This letter is regarding your medication refills.    For the best outcome for your transplant and in order for us to refill your prescriptions, we encourage you to have a yearly clinic appointment with a physician and to have current labs. If you are unable to return to our transplant center there are several alternatives. Please call your coordinator to discuss them. .  To make an appointment with a physician here at [facility], please call:  The Transplant Office at [phone] or [phone].  .      The Transplant Staff at [facility]

## 2020-05-21 ENCOUNTER — TELEPHONE (OUTPATIENT)
Dept: TRANSPLANT | Facility: CLINIC | Age: 50
End: 2020-05-21

## 2020-05-21 NOTE — TELEPHONE ENCOUNTER
Patient Call: General  Route to LPN    Reason for call: pt received the letter in the mail to make appointments here for refill OK's  Would like a call back     Call back needed? Yes    Return Call Needed  Same as documented in contacts section  When to return call?: Greater than one day: Route standard priority

## 2020-07-21 DIAGNOSIS — Z94.0 KIDNEY REPLACED BY TRANSPLANT: Primary | ICD-10-CM

## 2020-07-21 RX ORDER — PREDNISONE 5 MG/1
5 TABLET ORAL DAILY
Qty: 90 TABLET | Refills: 0 | Status: SHIPPED | OUTPATIENT
Start: 2020-07-21 | End: 2020-11-17

## 2020-09-21 DIAGNOSIS — Z94.0 KIDNEY REPLACED BY TRANSPLANT: Primary | ICD-10-CM

## 2020-09-21 NOTE — LETTER
Zaira Gomez  [address] [place] [address] [id number]  [facility] [id number]              September 22, 2020    Dear Zaira,    This letter is regarding your medication refills.    For the best outcome for your transplant and in order for us to refill your prescriptions, we encourage you to have a yearly clinic appointment with a physician and to have current labs (every 3 months).   If you are being followed by another Provider please call the Transplant Office and let us know.    To make an appointment with a physician here at [facility], please call:  The Transplant Office at [phone] or [phone].     The Transplant Staff at [facility]

## 2020-09-21 NOTE — LETTER
Zaira Gomez  [address] [place] [address] [id number]  [facility] [address]              September 22, 2020    Dear Zaira,    This letter is regarding your medication refills.    For the best outcome for your transplant and in order for us to refill your prescriptions, we encourage you to have a yearly clinic appointment with a physician and to have current labs (every 3 months).   If you are being followed by another Provider please call the Transplant Office and let us know.    To make an appointment with a physician here at [facility], please call:  The Transplant Office at [phone] or [phone].     The Transplant Staff at [facility]

## 2020-09-22 RX ORDER — PREDNISONE 5 MG/1
5 TABLET ORAL DAILY
Qty: 30 TABLET | Refills: 0 | OUTPATIENT
Start: 2020-09-22

## 2020-09-22 NOTE — TELEPHONE ENCOUNTER
Last seen in Clinic 11/7/2017  Last lab results in system 1/24/18.    PLAN:  Call and verify who is following her for Transplant as we have not seen her since Nov 2017.  Is she getting quarterly labs? There are no labs in [facility] or [facility]. Which lab is she using so we can request her lab results.  If she does see another Provider for her Transplant. Get name and Facility so we can connect with them.  Otherwise we need annual Transplant Provider visits and quarterly labs.  Invite back for Annual face-to-face appointment with Transplant Nephrology.  Send a message to Scheduling pool to set-up visit.  Please send a letter if unable to discuss on the phone.    OUTCOME:  Called mobile # [phone] and while ringing just got disconnected.  Called again and was a busy signal.    Called home # and left a message.  Recommended she call the transplant center to schedule an appointment if she does not already follow with another provider.  Message sent to  to schedule Virtual Visit with Dr. Perez (lives in [place]).  Letter sent.    Per [facility] Dr. Shantel Glover is Prescribing Tac and mmf.  RNCC discussed patient not seen at the [place]. They will send Prednisone prescription to Dr. Glover.

## 2020-09-24 ENCOUNTER — DOCUMENTATION ONLY (OUTPATIENT)
Dept: TRANSPLANT | Facility: CLINIC | Age: 50
End: 2020-09-24

## 2020-09-24 NOTE — PROGRESS NOTES
need annual Transplant visit next available (last visit 3 hrs ago) with Dr. Perez --- thanks   Received: Today   Message Contents   Camille Allen Teresa, RN               She has a dr in [facility] that took over her care    she says

## 2020-10-29 ENCOUNTER — TELEPHONE (OUTPATIENT)
Dept: TRANSPLANT | Facility: CLINIC | Age: 50
End: 2020-10-29

## 2020-10-29 DIAGNOSIS — Z94.0 KIDNEY REPLACED BY TRANSPLANT: Primary | ICD-10-CM

## 2020-10-29 RX ORDER — PREDNISONE 5 MG/1
5 TABLET ORAL DAILY
Qty: 90 TABLET | Refills: 0 | OUTPATIENT
Start: 2020-10-29

## 2020-10-29 NOTE — TELEPHONE ENCOUNTER
Patient Call: General  Route to LPN    Reason for call: Pt has been having diarrhea  2-3 times a day    Being followed by  GI   Has had  + noravirus- c-diff and e-coli  They recommend her checking with us to review and adjust  her transplant meds     Call back needed? Yes    Return Call Needed  Same as documented in contacts section  When to return call?: Greater than one day: Route standard priority

## 2020-10-29 NOTE — TELEPHONE ENCOUNTER
Called back Zaira Gomez, rang a few times then got disconnected. Unable to leave a message.  Called again. Busy tone.

## 2020-10-30 NOTE — TELEPHONE ENCOUNTER
Called Nephrology Dr. Glover's office to confirm current Immunosuppression she is on.  Dr. Glover has been prescribing immunosuppression. Left message to call back [facility].    Per Epi med list she is on:    Prograf 1.5 mg BID     mg BID    Pred 5 daily    OUTCOME:  Called back Rita at local Neph Clinic.  Confirms still takes above dosages.  RNCC discussed concern patient never picks up all or calls back.  Confirmed contact number. Updated number in Epic.  Message sent to dr. Contreras for recs on IS.

## 2020-11-02 DIAGNOSIS — Z94.0 KIDNEY REPLACED BY TRANSPLANT: Primary | ICD-10-CM

## 2020-11-02 NOTE — TELEPHONE ENCOUNTER
[11:01 AM] Becki Delgado      i have yanique from nephology at [facility]   ?[11:01 AM] Becki Delgado      regrading Zaira Gomez  ?[11:02 AM] Becki Delgado NP Gastology ([phone])    Called back Marisela TOLENTINO.  Was with a patient. Left a message re: recommendations regarding MMF.    LPN task:  Call Zaira Gomez or Gianna  ([phone]) who helps with patient regarding decreasing Mycophenolate to 500 mg BID.  Dr. Glover prescribing doctor made aware of changes.  Also discuss having Mycophenolic acid levels drawn next week. Discuss how to get a good 12 hour trough.   Make aware lab orders will be sent to [facility] lab.  Send lab orders to   [facility] [phone] (Phone)  [phone] (Fax)

## 2020-11-02 NOTE — TELEPHONE ENCOUNTER
RE: norovirus, Cdiff, Ecoli --- managed by GI locally  Received: 2 days ago  Message Contents   Leana Robertson MD Ututalum, Teresa, RN               Reduce MMF to 500 mg bid till diarrhea improves   Check MPA level & re-evaluate dosing next week, if diarrhea persists could switch to myfortic     thanks    Previous Messages    ----- Message -----   From: Shara Aquino RN   Sent: 10/30/2020   2:04 PM CDT   To: Leana Robertson MD   Subject: norovirus, Cdiff, Ecoli --- managed by GI lo*     Patient called stating she is having diarrhea 2-3x a day.  GI wanted recs regarding her IS with her norovirus, cdiff and Ecoli.   She is currently on:   Prograf 1.5 mg BID    mg BID   Pred 5 daily     Let me know what you recommend.     Stephan Parish         PLAN:  Call Dr. Glover clinic in [place] and discuss recommendations.     OUTCOME:  Left VM

## 2020-11-02 NOTE — LETTER
OUTPATIENT LABORATORY TEST ORDER    Patient Name: Zaira Gomez  Transplant Date: 8/9/2010   YOB: 1970                                      Issue Date & Time:11/2/2020  3:34 PM    [facility] MR:  [id number]  Exp. Date (1 year after date issued)      Diagnoses: Kidney Transplant (ICD-10  Z94.0)   Long term use of medications (ICD-10  Z79.899)     Lab results to be available on the same day drawn.   Patient should release information to the [facility].  Please fax to the Transplant Center at [phone].    Every 3 Months    ?Hemogram and Platelet   ?Basic Metabolic Panel (Sodium, Potassium, Chloride, CO2, Creatinine, BUN, Glucose, Calcium)   ?/Tacrolimus/Prograf drug level         ?Mycophenolic Acid                  Every 6 Months                                                        ?Urine for protein/creatinine    If you have any questions, please call The Transplant Center at [phone] or [phone].    Please fax labs to [phone]

## 2020-11-02 NOTE — TELEPHONE ENCOUNTER
Spoke to patient regarding:  Patient verbalizes understanding to decrease Mycophenolate dose to 500 mg BID.  Informed patient that Dr. Glover prescribing doctor made aware of changes.  Discussed having Mycophenolic acid levels drawn next week. Discussed how to get a good 12 hour trough.   Lab orders sent to [facility] lab.

## 2020-11-03 RX ORDER — MYCOPHENOLATE MOFETIL 250 MG/1
500 CAPSULE ORAL 2 TIMES DAILY
Qty: 120 CAPSULE | Refills: 11 | OUTPATIENT
Start: 2020-11-03

## 2020-11-04 RX ORDER — MYCOPHENOLATE MOFETIL 250 MG/1
500 CAPSULE ORAL 2 TIMES DAILY
Qty: 120 CAPSULE | Refills: 11 | OUTPATIENT
Start: 2020-11-04

## 2020-11-17 DIAGNOSIS — Z94.0 KIDNEY REPLACED BY TRANSPLANT: Primary | ICD-10-CM

## 2020-11-17 RX ORDER — PREDNISONE 5 MG/1
5 TABLET ORAL DAILY
Qty: 90 TABLET | Refills: 0 | Status: SHIPPED | OUTPATIENT
Start: 2020-11-17

## 2021-02-13 DIAGNOSIS — Z94.0 KIDNEY REPLACED BY TRANSPLANT: Primary | ICD-10-CM

## 2021-02-15 RX ORDER — PREDNISONE 5 MG/1
5 TABLET ORAL DAILY
Qty: 30 TABLET | Refills: 0 | OUTPATIENT
Start: 2021-02-15

## 2022-01-01 ENCOUNTER — DOCUMENTATION ONLY (OUTPATIENT)
Dept: TRANSPLANT | Facility: CLINIC | Age: 52
End: 2022-01-01
Payer: MEDICARE

## 2022-01-01 ENCOUNTER — TELEPHONE (OUTPATIENT)
Dept: TRANSPLANT | Facility: CLINIC | Age: 52
End: 2022-01-01
Payer: MEDICARE

## 2022-01-01 DIAGNOSIS — Z48.298 AFTERCARE FOLLOWING ORGAN TRANSPLANT: ICD-10-CM

## 2022-01-01 DIAGNOSIS — Z94.0 KIDNEY REPLACED BY TRANSPLANT: Primary | ICD-10-CM

## 2022-01-01 DIAGNOSIS — Z79.899 ENCOUNTER FOR LONG-TERM CURRENT USE OF MEDICATION: ICD-10-CM

## 2022-01-01 RX ORDER — TACROLIMUS 0.5 MG/1
0.5 CAPSULE, GELATIN COATED ORAL 2 TIMES DAILY
Qty: 180 CAPSULE | Refills: 0 | Status: CANCELLED | OUTPATIENT
Start: 2022-01-01

## 2022-01-01 RX ORDER — TACROLIMUS 1 MG/1
1 CAPSULE, GELATIN COATED ORAL 2 TIMES DAILY
Qty: 180 CAPSULE | Refills: 0 | Status: CANCELLED | OUTPATIENT
Start: 2022-01-01

## 2022-01-01 RX ORDER — PREDNISONE 5 MG/1
5 TABLET ORAL DAILY
Qty: 90 TABLET | Refills: 0 | Status: CANCELLED | OUTPATIENT
Start: 2022-01-01

## 2022-02-07 NOTE — TELEPHONE ENCOUNTER
Left message for patient stating:   We did not prescribe folate and patient will need to discuss with prescribing MD.  Encouraged patient to return call to txp office if other questions/concerns.

## 2022-02-25 NOTE — LETTER
OUTPATIENT LABORATORY TEST ORDER    Patient Name: Zaira Gomez  Transplant Date: 8/9/2010   YOB: 1970                                      Issue Date & Time:2/25/2022  4:17 PM    [facility] MR:  [id number]  Exp. Date (1 year after date issued)      Diagnoses: Kidney Transplant (ICD-10  Z94.0)   Long term use of medications (ICD-10  Z79.899)     Lab results to be available on the same day drawn.   Patient should release information to the [facility] Transplant Center.  Please fax to the Transplant Center at [phone].    Every 3 Months    ?Hemogram and Platelet   ?Basic Metabolic Panel (Sodium, Potassium, Chloride, CO2, Creatinine, BUN, Glucose, Calcium)   ?/Tacrolimus/Prograf drug level         ?Mycophenolic Acid                  Every 6 Months                                                        ?Urine for protein/creatinine    If you have any questions, please call The Transplant Center at [phone] or [phone].    Please fax labs to [phone]

## 2022-02-25 NOTE — TELEPHONE ENCOUNTER
/ Adalberto ([facility])  [phone] prescribes Immunosuppression medications for Zaira.

## 2022-03-25 NOTE — PROGRESS NOTES
Phil Cisneros MD   3/24/2022  8:45 PM CDT Back to Top        Slightly elevated serum creatinine, but marked decrease in serum sodium level.  Recommend further assessment by PCP and determine if patient has recently been started on any new medications.  If not, would need further evaluation.       Rowan notes 3/24/22:    Plan:  Patient presents with a quite complicated case. Given her history of kidney transplant, atrial fibrillation, diabetes, COVID infection with prolonged hospitalization, and now electrolyte imbalances. We will recheck CMP, CBC, TSH, magnesium, vitamin D and UA. Patient to stop metformin. Patient and her friend were given the recommendation that if patient has any worsening of symptoms, she is to call 911 for evaluation in the ER. We will confirm with Psych, Dr. Delacruz, if any medication changes were made that may be causing her symptoms. Encouraged increased oral intake and fluid intake. Follow up in 3 days. Patient will be set up for MRI of the brain.     Patient voices understanding and is in agreement with this plan.    Alee Zaman, DELBERT-C, APNP  Collaborating Physician Heriberto Romo M.D.

## 2022-03-25 NOTE — TELEPHONE ENCOUNTER
DATE:  3/25/2022     TIME OF RECEIPT FROM LAB:  1240    ORDERING PROVIDER:     LAB TEST:  Tacrolimus     LAB VALUE:  27.9    RESULTS GIVEN WITH READ-BACK TO (PROVIDER):  GILBERT OROZCO    TIME LAB VALUE REPORTED TO PROVIDER:

## 2022-03-25 NOTE — TELEPHONE ENCOUNTER
"In the past patient states she is followed locally. We last saw her in 2017.  We invited her to be seen here but never called back to schedule with a Provider at the [facility].  Dr. Glover at [facility] prescribes her IS meds.  Tac level elevated at 27.9   Called Zaira and check if she took her Tac dose before her lab draw.  States she did take her dose and was not aware they are drawing a tacrolimus level.  States she saw the doctor because her \"electrolytes are wacky\"  Reports she is not starving herself but she odes not want to eat.  No dose change made. She will repeat levels.    "

## 2022-04-05 ENCOUNTER — TELEPHONE (OUTPATIENT)
Dept: TRANSPLANT | Facility: CLINIC | Age: 52
End: 2022-04-05
Payer: MEDICARE

## 2022-04-05 NOTE — TELEPHONE ENCOUNTER
MPA level =  < 0.5  (3/24/22)  undetectable  Goal 1-3.5  Current Mycophenolate dose 750 bid    Last seen Now 2017  She follows with Nephrologists at [facility]  (Dr. Glover / Dr. Lieva).      Diego Stephens MD Ututalum, Teresa, RN  Cc: Delfino Lira MD  Hi,  Local provider called me about her being admitted at the [facility]. She is hypertensive and now on Bipap. They asked for recs for IS. I told them to give SL Tac 1.5 gm BID and check level trough in 2 days. Hold CellCept and Start Hydrocortisone 50 mg q 6 for shock which will help cover stress dose steroid.     Thanks,   NK     LPN task:  Check if still inpatient.  If discharged, confirm MMF dose and have levels rechecked.

## 2022-04-05 NOTE — LETTER
PHYSICIAN ORDERS      DATE & TIME ISSUED: 2022 10:19 AM  PATIENT NAME: Zaira Gomez   : 1970     [facility] MR# [if applicable]: [id number]     DIAGNOSIS:  Kidney transplant   ICD-10 CODE: Z94.0      Please complete the following labs within one week of hospital discharge.   Tacrolimus level (ensure 12 hours between last dose and blood draw)  Mycophenolate level ( ensure 12 hours between last dose and blood draw)  CBC with platelets and differential  BMP    Any questions please call: [phone] option 5    Please fax results to [phone].      Delfino Lira

## 2022-04-06 NOTE — TELEPHONE ENCOUNTER
Call placed to patient. No answer. Detailed voice message left instructing patient to return call to confirm current MMF dose and repeat labs once discharged from the hospital. Order sent

## 2022-04-07 ENCOUNTER — POST MORTEM DOCUMENTATION (OUTPATIENT)
Dept: TRANSPLANT | Facility: CLINIC | Age: 52
End: 2022-04-07
Payer: MEDICARE

## 2022-04-07 ENCOUNTER — TELEPHONE (OUTPATIENT)
Dept: TRANSPLANT | Facility: CLINIC | Age: 52
End: 2022-04-07

## 2022-04-07 NOTE — TELEPHONE ENCOUNTER
Provider Call: Transplant Provider  TIM Díaz from [facility] called  Sadly Zaira passed away 2022 While inpatient at [facility] in [place], [place]    Here is a copy of her Obituary:  linnea Gomez  1970 ~ 2022 (age 51)    [facility]  Obituary  Zaira Gomez, age 51, passed away 2022 at [facility] in [place] after a long struggle with Covid pneumonia complicated by a lifelong struggle with kidney disease.    Services will be at 1:00 P.M. on Saturday, May 21, 2021 at [facility] in [place]. Visitation will be from 11:00 A.M. until the time of services on Saturday at the [facility] homeMirian Mcmanus was born 1970 in [facility] and adopted by German and Lizeth Gomez in .  She spent her entire life in [place], graduated from [employer] in , and studied briefly at the [employer].  Marietta was active in her [employer]oration[employer] and had many friends in her community.  Despite her chronic health issues, she embodied a elva for life and loved her family and friends.  She was quick to laugh, gracious, kind and generous and always remembered everyone s birthdays.  She enjoyed cooking and loved making cards.    Marietta was preceded in death by her parents Lizeth and German Gomez.  She is survived by her brothers: King (Flaquita) Jason of [place], [place], Ace (Marisabel) Jason of [place], [place], Juan M Gomez of [place], and Román (Emmy) Jason of [place], [place]; and sister, Jerry (Julian Geronimo of [place], [place].  She was also a wonderful aunt to numerous nieces and nephews.    We would like to thank those who were such great caregivers to Marietta, the ICU staff at [facility], and the staff of [facility].  There are so many others to thank and if you were not mentioned and helped with Marietta s  care over the years, we offer our deepest appreciation.    Marietta never lost ishmael in her Lord through all the problems and pain she endured and gave even when she had little to give.  Her life can best be expressed with Juan Luis 1:12; Taylormadhusanjuanita is the one who perserveres under trial because, having stood the test, the person will receive the crown of Life that the Lord has promised to all who love him.  We know where you are Marietta, we miss you but now you are restored.  Rest in the Lord s joyous peace.    [facility] is honored to be assisting the family.    To send sympathy gifts to the family or plant a tree in memory of Zaira Gomez, please visit our [employer] store.    Services  VISITATION  2022  11:00 AM to 7:00 PM  [facility]     Jian

## 2022-04-07 NOTE — Clinical Note
TIS to finish.  Admins to cancel appointments and send Sympathy Card.  DO NOT CLOSE if you are not TIS.

## 2022-04-07 NOTE — PROGRESS NOTES
Received notification on 22 of patient's death from TIM Díaz from [facility].   Place of death was reported was [facility] in [place], [place] .  Graft status at the time of death was reported as Functioning.  TIS verification is: Pending  The Transplant Office has been notified that patient is . The Post Mortem Encounter has been completed. Notifications have been sent to the Care team.   Instructions have been sent to cancel pending appointments, discontinue pending orders, eliminate paper chart and send a sympathy card to the family.  GAB STEWART  Received notification of patient's death from Shara Aquino RN.  Place of death was reported as [facility] in [facility].  Graft status at the time of death was reported as Functioning.  Additional information: Admitted for SOB with O2 sats in the 60's. Placed on BiPAP. Findings consistent with sepsis with acute hypoxic respiratory failure and septic shock along with bilateral pneumonia. After discussion with her brothers, refused further aggressive intervention so was made DNR/DNI and taken off BiPAP.  TIS verification is: Complete

## 2022-04-11 ENCOUNTER — DOCUMENTATION ONLY (OUTPATIENT)
Dept: TRANSPLANT | Facility: CLINIC | Age: 52
End: 2022-04-11